# Patient Record
Sex: FEMALE | URBAN - METROPOLITAN AREA
[De-identification: names, ages, dates, MRNs, and addresses within clinical notes are randomized per-mention and may not be internally consistent; named-entity substitution may affect disease eponyms.]

---

## 2023-01-01 ENCOUNTER — HOSPITAL ENCOUNTER (INPATIENT)
Facility: HOSPITAL | Age: 33
LOS: 1 days | DRG: 208 | End: 2023-04-21
Attending: EMERGENCY MEDICINE | Admitting: STUDENT IN AN ORGANIZED HEALTH CARE EDUCATION/TRAINING PROGRAM

## 2023-01-01 VITALS
OXYGEN SATURATION: 68 % | SYSTOLIC BLOOD PRESSURE: 111 MMHG | BODY MASS INDEX: 29.21 KG/M2 | WEIGHT: 158.75 LBS | RESPIRATION RATE: 6 BRPM | HEIGHT: 62 IN | DIASTOLIC BLOOD PRESSURE: 76 MMHG | TEMPERATURE: 96 F

## 2023-01-01 DIAGNOSIS — I46.9 CARDIAC ARREST: ICD-10-CM

## 2023-01-01 LAB
ALBUMIN SERPL BCP-MCNC: 3.6 G/DL (ref 3.5–5.2)
ALLENS TEST: ABNORMAL
ALP SERPL-CCNC: 127 U/L (ref 55–135)
ALT SERPL W/O P-5'-P-CCNC: 192 U/L (ref 10–44)
AMPHET+METHAMPHET UR QL: NEGATIVE
ANION GAP SERPL CALC-SCNC: 25 MMOL/L (ref 8–16)
APTT PPP: 90 SEC (ref 21–32)
APTT PPP: 96.4 SEC (ref 21–32)
AST SERPL-CCNC: 284 U/L (ref 10–40)
B-HCG UR QL: NEGATIVE
BACTERIA #/AREA URNS HPF: ABNORMAL /HPF
BARBITURATES UR QL SCN>200 NG/ML: NEGATIVE
BASOPHILS NFR BLD: 1 % (ref 0–1.9)
BENZODIAZ UR QL SCN>200 NG/ML: NEGATIVE
BILIRUB SERPL-MCNC: 0.2 MG/DL (ref 0.1–1)
BILIRUB UR QL STRIP: NEGATIVE
BNP SERPL-MCNC: 16 PG/ML (ref 0–99)
BUN SERPL-MCNC: 17 MG/DL (ref 6–20)
BZE UR QL SCN: NEGATIVE
CALCIUM SERPL-MCNC: 8.5 MG/DL (ref 8.7–10.5)
CANNABINOIDS UR QL SCN: NEGATIVE
CHLORIDE SERPL-SCNC: 110 MMOL/L (ref 95–110)
CLARITY UR: ABNORMAL
CO2 SERPL-SCNC: 8 MMOL/L (ref 23–29)
COLOR UR: YELLOW
CREAT SERPL-MCNC: 1.9 MG/DL (ref 0.5–1.4)
CREAT UR-MCNC: <5 MG/DL (ref 15–325)
CTP QC/QA: YES
DELSYS: ABNORMAL
DIFFERENTIAL METHOD: ABNORMAL
EOSINOPHIL NFR BLD: 0 % (ref 0–8)
ERYTHROCYTE [DISTWIDTH] IN BLOOD BY AUTOMATED COUNT: 14.1 % (ref 11.5–14.5)
ERYTHROCYTE [SEDIMENTATION RATE] IN BLOOD BY WESTERGREN METHOD: 22 MM/H
ERYTHROCYTE [SEDIMENTATION RATE] IN BLOOD BY WESTERGREN METHOD: 30 MM/H
ERYTHROCYTE [SEDIMENTATION RATE] IN BLOOD BY WESTERGREN METHOD: 35 MM/H
ERYTHROCYTE [SEDIMENTATION RATE] IN BLOOD BY WESTERGREN METHOD: 40 MM/H
EST. GFR  (NO RACE VARIABLE): 34 ML/MIN/1.73 M^2
ETHANOL SERPL-MCNC: <10 MG/DL
FIO2: 100
FIO2: 60
FIO2: 70
FIO2: 80
GLUCOSE SERPL-MCNC: 197 MG/DL (ref 70–110)
GLUCOSE UR QL STRIP: ABNORMAL
HCO3 UR-SCNC: 12.4 MMOL/L (ref 24–28)
HCO3 UR-SCNC: 13.1 MMOL/L (ref 24–28)
HCO3 UR-SCNC: 13.2 MMOL/L (ref 24–28)
HCO3 UR-SCNC: 13.7 MMOL/L (ref 24–28)
HCT VFR BLD AUTO: 41.7 % (ref 37–48.5)
HCT VFR BLD CALC: 41 %PCV (ref 36–54)
HGB BLD-MCNC: 12.4 G/DL (ref 12–16)
HGB BLD-MCNC: 14 G/DL
HGB UR QL STRIP: ABNORMAL
HYALINE CASTS #/AREA URNS LPF: 0 /LPF
IMM GRANULOCYTES # BLD AUTO: ABNORMAL K/UL (ref 0–0.04)
IMM GRANULOCYTES NFR BLD AUTO: ABNORMAL % (ref 0–0.5)
INR PPP: 1.8 (ref 0.8–1.2)
INR PPP: 2.1 (ref 0.8–1.2)
INR PPP: 2.1 (ref 0.8–1.2)
KETONES UR QL STRIP: NEGATIVE
LACTATE SERPL-SCNC: 11.2 MMOL/L (ref 0.5–2.2)
LEUKOCYTE ESTERASE UR QL STRIP: NEGATIVE
LYMPHOCYTES NFR BLD: 71 % (ref 18–48)
MCH RBC QN AUTO: 30.2 PG (ref 27–31)
MCHC RBC AUTO-ENTMCNC: 29.7 G/DL (ref 32–36)
MCV RBC AUTO: 102 FL (ref 82–98)
METHADONE UR QL SCN>300 NG/ML: NEGATIVE
MICROSCOPIC COMMENT: ABNORMAL
MIN VOL: 10.5
MIN VOL: 14
MIN VOL: 17
MODE: ABNORMAL
MONOCYTES NFR BLD: 2 % (ref 4–15)
MYELOCYTES NFR BLD MANUAL: 1 %
NEUTROPHILS NFR BLD: 22 % (ref 38–73)
NEUTS BAND NFR BLD MANUAL: 3 %
NITRITE UR QL STRIP: NEGATIVE
NRBC BLD-RTO: 0 /100 WBC
OPIATES UR QL SCN: NEGATIVE
PCO2 BLDA: 34.5 MMHG (ref 35–45)
PCO2 BLDA: 43.6 MMHG (ref 35–45)
PCO2 BLDA: 51.4 MMHG (ref 35–45)
PCO2 BLDA: 86.8 MMHG (ref 35–45)
PCP UR QL SCN>25 NG/ML: NEGATIVE
PEEP: 5
PH SMN: 6.76 [PH] (ref 7.35–7.45)
PH SMN: 7.02 [PH] (ref 7.35–7.45)
PH SMN: 7.09 [PH] (ref 7.35–7.45)
PH SMN: 7.21 [PH] (ref 7.35–7.45)
PH UR STRIP: 8 [PH] (ref 5–8)
PIP: 23
PIP: 24
PIP: 27
PLATELET # BLD AUTO: 99 K/UL (ref 150–450)
PLATELET BLD QL SMEAR: ABNORMAL
PMV BLD AUTO: 11.3 FL (ref 9.2–12.9)
PO2 BLDA: 344 MMHG (ref 80–100)
PO2 BLDA: 64 MMHG (ref 80–100)
PO2 BLDA: 78 MMHG (ref 80–100)
PO2 BLDA: 85 MMHG (ref 80–100)
POC BE: -14 MMOL/L
POC BE: -17 MMOL/L
POC BE: -18 MMOL/L
POC BE: -23 MMOL/L
POC SATURATED O2: 100 % (ref 95–100)
POC SATURATED O2: 87 % (ref 95–100)
POC SATURATED O2: 89 % (ref 95–100)
POC SATURATED O2: 90 % (ref 95–100)
POC TCO2: 14 MMOL/L (ref 23–27)
POC TCO2: 15 MMOL/L (ref 23–27)
POTASSIUM BLD-SCNC: 4.6 MMOL/L (ref 3.5–5.1)
POTASSIUM SERPL-SCNC: 4.9 MMOL/L (ref 3.5–5.1)
PROT SERPL-MCNC: 7.4 G/DL (ref 6–8.4)
PROT UR QL STRIP: ABNORMAL
PROTHROMBIN TIME: 18.2 SEC (ref 9–12.5)
PROTHROMBIN TIME: 21.3 SEC (ref 9–12.5)
PROTHROMBIN TIME: 21.3 SEC (ref 9–12.5)
RBC # BLD AUTO: 4.11 M/UL (ref 4–5.4)
RBC #/AREA URNS HPF: 19 /HPF (ref 0–4)
SAMPLE: ABNORMAL
SITE: ABNORMAL
SODIUM BLD-SCNC: 141 MMOL/L (ref 136–145)
SODIUM SERPL-SCNC: 143 MMOL/L (ref 136–145)
SP GR UR STRIP: 1.01 (ref 1–1.03)
SP02: 88
SP02: 90
SP02: 90
SQUAMOUS #/AREA URNS HPF: 2 /HPF
TOXICOLOGY INFORMATION: ABNORMAL
TROPONIN I SERPL DL<=0.01 NG/ML-MCNC: 20.37 NG/ML (ref 0–0.03)
URN SPEC COLLECT METH UR: ABNORMAL
UROBILINOGEN UR STRIP-ACNC: NEGATIVE EU/DL
VT: 350
VT: 350
VT: 400
VT: 425
WBC # BLD AUTO: 13.05 K/UL (ref 3.9–12.7)
WBC #/AREA URNS HPF: 12 /HPF (ref 0–5)
YEAST URNS QL MICRO: ABNORMAL

## 2023-01-01 PROCEDURE — 25000003 PHARM REV CODE 250: Performed by: INTERNAL MEDICINE

## 2023-01-01 PROCEDURE — U0002 COVID-19 LAB TEST NON-CDC: HCPCS | Performed by: STUDENT IN AN ORGANIZED HEALTH CARE EDUCATION/TRAINING PROGRAM

## 2023-01-01 PROCEDURE — 94761 N-INVAS EAR/PLS OXIMETRY MLT: CPT

## 2023-01-01 PROCEDURE — 83605 ASSAY OF LACTIC ACID: CPT | Performed by: EMERGENCY MEDICINE

## 2023-01-01 PROCEDURE — 85730 THROMBOPLASTIN TIME PARTIAL: CPT | Mod: 91 | Performed by: EMERGENCY MEDICINE

## 2023-01-01 PROCEDURE — 96365 THER/PROPH/DIAG IV INF INIT: CPT

## 2023-01-01 PROCEDURE — 93005 ELECTROCARDIOGRAM TRACING: CPT

## 2023-01-01 PROCEDURE — 51702 INSERT TEMP BLADDER CATH: CPT

## 2023-01-01 PROCEDURE — 31500 INSERT EMERGENCY AIRWAY: CPT

## 2023-01-01 PROCEDURE — 20000000 HC ICU ROOM

## 2023-01-01 PROCEDURE — 93010 EKG 12-LEAD: ICD-10-PCS | Mod: 76,,, | Performed by: INTERNAL MEDICINE

## 2023-01-01 PROCEDURE — 99291 CRITICAL CARE FIRST HOUR: CPT

## 2023-01-01 PROCEDURE — 85027 COMPLETE CBC AUTOMATED: CPT | Performed by: EMERGENCY MEDICINE

## 2023-01-01 PROCEDURE — 25000003 PHARM REV CODE 250: Performed by: STUDENT IN AN ORGANIZED HEALTH CARE EDUCATION/TRAINING PROGRAM

## 2023-01-01 PROCEDURE — 93010 EKG 12-LEAD: ICD-10-PCS | Mod: ,,, | Performed by: INTERNAL MEDICINE

## 2023-01-01 PROCEDURE — 82077 ASSAY SPEC XCP UR&BREATH IA: CPT | Performed by: EMERGENCY MEDICINE

## 2023-01-01 PROCEDURE — 99900035 HC TECH TIME PER 15 MIN (STAT)

## 2023-01-01 PROCEDURE — 83880 ASSAY OF NATRIURETIC PEPTIDE: CPT | Performed by: EMERGENCY MEDICINE

## 2023-01-01 PROCEDURE — 36600 WITHDRAWAL OF ARTERIAL BLOOD: CPT

## 2023-01-01 PROCEDURE — 81025 URINE PREGNANCY TEST: CPT | Performed by: EMERGENCY MEDICINE

## 2023-01-01 PROCEDURE — 92950 HEART/LUNG RESUSCITATION CPR: CPT

## 2023-01-01 PROCEDURE — 63600175 PHARM REV CODE 636 W HCPCS: Performed by: STUDENT IN AN ORGANIZED HEALTH CARE EDUCATION/TRAINING PROGRAM

## 2023-01-01 PROCEDURE — 87086 URINE CULTURE/COLONY COUNT: CPT | Performed by: EMERGENCY MEDICINE

## 2023-01-01 PROCEDURE — 81000 URINALYSIS NONAUTO W/SCOPE: CPT | Mod: 59 | Performed by: EMERGENCY MEDICINE

## 2023-01-01 PROCEDURE — 85610 PROTHROMBIN TIME: CPT | Mod: 91 | Performed by: EMERGENCY MEDICINE

## 2023-01-01 PROCEDURE — 93010 ELECTROCARDIOGRAM REPORT: CPT | Mod: ,,, | Performed by: INTERNAL MEDICINE

## 2023-01-01 PROCEDURE — 25000003 PHARM REV CODE 250: Performed by: EMERGENCY MEDICINE

## 2023-01-01 PROCEDURE — 63600175 PHARM REV CODE 636 W HCPCS: Performed by: EMERGENCY MEDICINE

## 2023-01-01 PROCEDURE — 84484 ASSAY OF TROPONIN QUANT: CPT | Performed by: EMERGENCY MEDICINE

## 2023-01-01 PROCEDURE — 27202364 HC PAD, COOLING, ANY SIZE

## 2023-01-01 PROCEDURE — 25500020 PHARM REV CODE 255: Performed by: STUDENT IN AN ORGANIZED HEALTH CARE EDUCATION/TRAINING PROGRAM

## 2023-01-01 PROCEDURE — 27201640 HC PAD, ARTICGEL

## 2023-01-01 PROCEDURE — 80307 DRUG TEST PRSMV CHEM ANLYZR: CPT | Performed by: EMERGENCY MEDICINE

## 2023-01-01 PROCEDURE — 27000221 HC OXYGEN, UP TO 24 HOURS

## 2023-01-01 PROCEDURE — 93010 ELECTROCARDIOGRAM REPORT: CPT | Mod: 76,,, | Performed by: INTERNAL MEDICINE

## 2023-01-01 PROCEDURE — 82803 BLOOD GASES ANY COMBINATION: CPT

## 2023-01-01 PROCEDURE — 80053 COMPREHEN METABOLIC PANEL: CPT | Performed by: EMERGENCY MEDICINE

## 2023-01-01 PROCEDURE — 94002 VENT MGMT INPAT INIT DAY: CPT

## 2023-01-01 PROCEDURE — 85007 BL SMEAR W/DIFF WBC COUNT: CPT | Performed by: EMERGENCY MEDICINE

## 2023-01-01 RX ORDER — EPINEPHRINE 0.1 MG/ML
INJECTION INTRAVENOUS CODE/TRAUMA/SEDATION MEDICATION
Status: DISCONTINUED | OUTPATIENT
Start: 2023-01-01 | End: 2023-01-01 | Stop reason: HOSPADM

## 2023-01-01 RX ORDER — SODIUM CHLORIDE 0.9 % (FLUSH) 0.9 %
10 SYRINGE (ML) INJECTION
Status: DISCONTINUED | OUTPATIENT
Start: 2023-01-01 | End: 2023-01-01 | Stop reason: HOSPADM

## 2023-01-01 RX ORDER — FAMOTIDINE 20 MG/1
20 TABLET, FILM COATED ORAL DAILY
Status: DISCONTINUED | OUTPATIENT
Start: 2023-01-01 | End: 2023-01-01 | Stop reason: HOSPADM

## 2023-01-01 RX ORDER — NOREPINEPHRINE BITARTRATE/D5W 4MG/250ML
PLASTIC BAG, INJECTION (ML) INTRAVENOUS
Status: DISCONTINUED
Start: 2023-01-01 | End: 2023-01-01 | Stop reason: HOSPADM

## 2023-01-01 RX ORDER — SODIUM CHLORIDE 9 MG/ML
1000 INJECTION, SOLUTION INTRAVENOUS
Status: DISCONTINUED | OUTPATIENT
Start: 2023-01-01 | End: 2023-01-01 | Stop reason: HOSPADM

## 2023-01-01 RX ORDER — NOREPINEPHRINE BITARTRATE/D5W 4MG/250ML
0-3 PLASTIC BAG, INJECTION (ML) INTRAVENOUS CONTINUOUS
Status: DISCONTINUED | OUTPATIENT
Start: 2023-01-01 | End: 2023-01-01

## 2023-01-01 RX ORDER — HEPARIN SODIUM,PORCINE/D5W 25000/250
0-40 INTRAVENOUS SOLUTION INTRAVENOUS CONTINUOUS
Status: DISCONTINUED | OUTPATIENT
Start: 2023-01-01 | End: 2023-01-01 | Stop reason: HOSPADM

## 2023-01-01 RX ORDER — METOPROLOL TARTRATE 1 MG/ML
INJECTION, SOLUTION INTRAVENOUS
Status: COMPLETED
Start: 2023-01-01 | End: 2023-01-01

## 2023-01-01 RX ORDER — SODIUM CHLORIDE, SODIUM LACTATE, POTASSIUM CHLORIDE, CALCIUM CHLORIDE 600; 310; 30; 20 MG/100ML; MG/100ML; MG/100ML; MG/100ML
INJECTION, SOLUTION INTRAVENOUS CONTINUOUS
Status: DISCONTINUED | OUTPATIENT
Start: 2023-01-01 | End: 2023-01-01

## 2023-01-01 RX ORDER — MUPIROCIN 20 MG/G
OINTMENT TOPICAL 2 TIMES DAILY
Status: DISCONTINUED | OUTPATIENT
Start: 2023-01-01 | End: 2023-01-01 | Stop reason: HOSPADM

## 2023-01-01 RX ADMIN — SODIUM BICARBONATE: 84 INJECTION, SOLUTION INTRAVENOUS at 09:04

## 2023-01-01 RX ADMIN — SODIUM CHLORIDE, POTASSIUM CHLORIDE, SODIUM LACTATE AND CALCIUM CHLORIDE: 600; 310; 30; 20 INJECTION, SOLUTION INTRAVENOUS at 07:04

## 2023-01-01 RX ADMIN — NOREPINEPHRINE BITARTRATE 0.02 MCG/KG/MIN: 1 INJECTION, SOLUTION, CONCENTRATE INTRAVENOUS at 09:04

## 2023-01-01 RX ADMIN — EPINEPHRINE 1 MG: 0.1 INJECTION INTRACARDIAC; INTRAVENOUS at 06:04

## 2023-01-01 RX ADMIN — FAMOTIDINE 20 MG: 20 TABLET, FILM COATED ORAL at 02:04

## 2023-01-01 RX ADMIN — VANCOMYCIN HYDROCHLORIDE 1500 MG: 1.5 INJECTION, POWDER, LYOPHILIZED, FOR SOLUTION INTRAVENOUS at 10:04

## 2023-01-01 RX ADMIN — MUPIROCIN: 20 OINTMENT TOPICAL at 10:04

## 2023-01-01 RX ADMIN — SODIUM CHLORIDE 250 ML: 9 INJECTION, SOLUTION INTRAVENOUS at 10:04

## 2023-01-01 RX ADMIN — PIPERACILLIN AND TAZOBACTAM 4.5 G: 4; .5 INJECTION, POWDER, LYOPHILIZED, FOR SOLUTION INTRAVENOUS; PARENTERAL at 12:04

## 2023-01-01 RX ADMIN — NOREPINEPHRINE BITARTRATE 3 MCG/KG/MIN: 1 INJECTION, SOLUTION, CONCENTRATE INTRAVENOUS at 07:04

## 2023-01-01 RX ADMIN — VASOPRESSIN 0.04 UNITS/MIN: 20 INJECTION INTRAVENOUS at 12:04

## 2023-01-01 RX ADMIN — IOHEXOL 100 ML: 350 INJECTION, SOLUTION INTRAVENOUS at 08:04

## 2023-04-21 PROBLEM — J96.01 ACUTE HYPOXEMIC RESPIRATORY FAILURE: Status: ACTIVE | Noted: 2023-01-01

## 2023-04-21 PROBLEM — D65 DIC (DISSEMINATED INTRAVASCULAR COAGULATION): Status: ACTIVE | Noted: 2023-01-01

## 2023-04-21 PROBLEM — E87.20 LACTIC ACIDOSIS: Status: ACTIVE | Noted: 2023-01-01

## 2023-04-21 PROBLEM — G93.1 HYPOXIC BRAIN INJURY: Status: ACTIVE | Noted: 2023-01-01

## 2023-04-21 PROBLEM — I46.9 CARDIAC ARREST: Status: ACTIVE | Noted: 2023-01-01

## 2023-04-21 NOTE — PROGRESS NOTES
Dr Cross, Pulm CC MD,  BJ and RN at bedside with  to notify sister next of kin of patient's passing and offering autopsy to determine cause of death. Per patient's next of kin, patient's friend/  Chapito will be making all the decisions, to defer all questions to him. According to family, he is enroute to the hospitals. Patient will be transported to [facility] with belonging until friend's arrival.     Charge RN notified.

## 2023-04-21 NOTE — PLAN OF CARE
Brief update note     39 y/o F with no known PMH found unresponsive on a plane s/p more than 60 minutes CPR with ROSC achieved.  Currently on a ventilator, hypothermic with severe acidosis.  Pupils bilaterally dilated and fixed, no gag or cough or corneal reflex present.  Lab suggestive of DIC.  Suspected etiology for cardiac arrest PE.  CT head showed diffuse cerebral edema with cerebellar tonsillar herniation.  Worsening tachycardia, hypoxia.    Patient was flying from [place] to the [place] for work.  Updated patient's work friend Chapito with the help of a Vietnamese  on the telephone who is attempting to come to the night it states but will land tomorrow night.  Patient's only family member is reported to be her sister who lives in [place].  Access to sister difficult as she uses only wifi based services for communication.    Agree with Dr. Arizmendi- further CPR will not change outcome. There is no chance of meaningful recovery regardless of any intervention. She meets criteria for DNR. Will change code status to DNR.       Angela Cross MD   Internal Medicine Hospitalist

## 2023-04-21 NOTE — PROGRESS NOTES
Patient HR from 150s to 90s noted on monitor, sats 70%. Patient is DNR. PEA and then asystole noted on monitor. Unable to get BP despite increase in vasopressor. Dr Nicholson, Dr Cross notified.     1416 Dr Cross at bedside to pronounce patient. TOD 1424

## 2023-04-21 NOTE — PROGRESS NOTES
Pharmacokinetic Initial Assessment: IV Vancomycin    Assessment/Plan:    Initiate intravenous vancomycin with loading dose of 1500 mg once followed by a maintenance dose of vancomycin 750mg IV every 24 hours  Desired empiric serum trough concentration is 15 to 20 mcg/mL  Draw vancomycin trough level 60 min prior to third dose on 1000 at approximately 4/23  Pharmacy will continue to follow and monitor vancomycin.      Please contact pharmacy at extension [phone] with any questions regarding this assessment.     Thank you for the consult,   Wlil Lal       Patient brief summary:  Heather Staley is a 38 y.o. female initiated on antimicrobial therapy with IV Vancomycin for treatment of suspected lower respiratory infection    Drug Allergies:   Review of patient's allergies indicates:  Not on File    Actual Body Weight:   63.5kg    Renal Function:   Estimated Creatinine Clearance: 37.7 mL/min (A) (based on SCr of 1.9 mg/dL (H)).,     Dialysis Method (if applicable):      CBC (last 72 hours):  Recent Labs   Lab Result Units 04/21/23  0707   WBC K/uL 13.05*   Hemoglobin g/dL 12.4   Hematocrit % 41.7   Platelets K/uL 99*   Gran % % 22.0*   Lymph % % 71.0*   Mono % % 2.0*   Eosinophil % % 0.0   Basophil % % 1.0   Differential Method  Manual       Metabolic Panel (last 72 hours):  Recent Labs   Lab Result Units 04/21/23  0810   Sodium mmol/L 143   Potassium mmol/L 4.9   Chloride mmol/L 110   CO2 mmol/L 8*   Glucose mg/dL 197*   BUN mg/dL 17   Creatinine mg/dL 1.9*   Albumin g/dL 3.6   Total Bilirubin mg/dL 0.2   Alkaline Phosphatase U/L 127   AST U/L 284*   ALT U/L 192*       Drug levels (last 3 results):  No results for input(s): VANCOMYCINRA, VANCORANDOM, VANCOMYCINPE, VANCOPEAK, VANCOMYCINTR, VANCOTROUGH in the last 72 hours.    Microbiologic Results:  Microbiology Results (last 7 days)       Procedure Component Value Units Date/Time    Blood Culture #1 **CANNOT BE ORDERED STAT** [[id number]]     Order Status: Canceled  Specimen: Blood     Blood Culture #2 **CANNOT BE ORDERED STAT** [[id number]]     Order Status: Canceled Specimen: Blood

## 2023-04-21 NOTE — PROGRESS NOTES
Informed of patient passing away. Patient seen and examined. After 1 minute auscultation, no spontaneous heartbeat or respiration noted. No withdrawal to noxious stimulation. Pupils dilated and fixed bilaterally. Patient pronounced  at 2:20 PM.    Called patient's sister Igor through a wifi enabled bernardo with the help of electronic Brazilian  to inform of patient's passing with Brie BJ, Dr. Rubi & primary RN Tammie present.  Sister's  picked up the phone and stated Igor is not in a state to talk. Informed patient's brother in law of her passing. Condolences provided. Inquired about autopsy. He stated patient's friend Chapito is [place] and will make all the necessary decisions.     Patient has been released from the . Will remain in morgue until family arrives.     Angela Cross MD   Internal Medicine Hospitalist

## 2023-04-21 NOTE — Clinical Note
Chapito accompanied their family member to the emergency department on 2023. They may return to work on 2023.   needs to attend to a family member who has  at our facility. His family member suffered a cardiac arrest in flight and was transferred to our facility. Please let this letter serve as proof of need for passage to be at the bedside of his loved one.     If you have any questions or concerns, please don't hesitate to call.       ROBERTA

## 2023-04-21 NOTE — PROGRESS NOTES
Patient is a 's case. Will be released to [facility] 's office. Patient's belongings will stay here at [facility] in security department. Charge RN aware. Dr Cross also made aware and asked to relay to sister when informing her of patient's death.     1530  called back to release body. Body will to go to [facility]

## 2023-04-21 NOTE — ASSESSMENT & PLAN NOTE
Patient with Hypoxic Respiratory failure which is Acute.  she is not on home oxygen. Supplemental oxygen was provided and noted- Vent Mode: A/CMV-VC  Oxygen Concentration (%):  [] 100  Resp Rate Total:  [23 br/min-41 br/min] 41 br/min  Vt Set:  [350 mL-425 mL] 425 mL  PEEP/CPAP:  [5 cmH20-8 cmH20] 8 cmH20  Mean Airway Pressure:  [9.7 gvA94-64.4 cmH20] 16.4 cmH20    S/p cardiac arrest

## 2023-04-21 NOTE — HOSPITAL COURSE
33-year-old female with no known past medical history presented after a cardiac arrest on a flight suspected secondary to PE.  Complicated with a prolonged down time of more than 70 and severe anoxic brain injury seen on CT head.  Patient's family lives in [place].  We had trouble getting in touch with patient's sister as she used a wifi accessible line only during initial admission.  Given no chance of meaningful recovery and no benefit of further CPR as per discussion with intensivist Dr. Nicholson, patient was made a DNR.  Patient became hemodynamically unstable with hypoxia, tachycardia and subsequently had a PEA arrest.  Time of death: 1420 on 4/21/23.  Patient's family was informed of passing.  Patient's friend is on the way to the [place] from [place]. Please see separate death note for details.

## 2023-04-21 NOTE — DISCHARGE SUMMARY
[facility] Medicine  Discharge Summary      Patient Name: Heather Staley  MRN: [id number]  FABY: [id number]  Patient Class: IP- Inpatient  Admission Date: 4/21/2023  Hospital Length of Stay: 0 days  Discharge Date and Time:  4/21/23; 2:20PM    Attending Physician: Angela Cross MD   Discharging Provider: Angela Cross MD  Primary Care Provider: No primary care provider on file.    Primary Care Team: Networked reference to record PCT     HPI:   33-year-old female with no known past medical history brought to the ER s/p cardiac arrest.  She was flying from [place] to [place] when she c/o chest pain and became unresponsive.  CPR was started in the flight until she was brought to the ER with a down time of more than 75 minutes, more than 6 rounds of epi.  She was intubated in the ER and started on Levophed.  PH of 6.7, troponin 20.  Urine pregnancy test negative.  ER provider discussed case with on-call cardiologist since this could be a case of PE but she was deemed a poor thrombolysis candidate.  Was started on a heparin drip for presumed PE.  CT head showed severe anoxic brain injury.  Admitted to the ICU.      * No surgery found *      Hospital Course:   33-year-old female with no known past medical history presented after a cardiac arrest on a flight suspected secondary to PE.  Complicated with a prolonged down time of more than 70 and severe anoxic brain injury seen on CT head.  Patient's family lives in [place].  We had trouble getting in touch with patient's sister as she used a wifi accessible line only during initial admission.  Given no chance of meaningful recovery and no benefit of further CPR as per discussion with intensivist Dr. Nicholson, patient was made a DNR.  Patient became hemodynamically unstable with hypoxia, tachycardia and subsequently had a PEA arrest.  Time of death: 1420 on 4/21/23.  Patient's family was informed of passing.  Patient's friend [place] the way to the [place]  from [place]. Please see separate death note for details.       Goals of Care Treatment Preferences:  Code Status: DNR      Consults:   Consults (From admission, onward)          Status Ordering Provider     Pharmacy to dose Vancomycin consult  Once        Provider:  (Not yet assigned)   See [facility] for full Linked Orders Report.    Acknowledged JEFFREY MYERS            Final Active Diagnoses:    Diagnosis Date Noted POA    PRINCIPAL PROBLEM:  Cardiac arrest [I46.9] 2023 Yes    Hypoxic brain injury [G93.1] 2023 Yes    Lactic acidosis [E87.20] 2023 Yes    DIC (disseminated intravascular coagulation) [D65] 2023 Yes    Acute hypoxemic respiratory failure [J96.01] 2023 Yes      Problems Resolved During this Admission:       Discharged Condition:     Significant Diagnostic Studies: Labs:   BMP:   Recent Labs   Lab 23  0810   *      K 4.9      CO2 8*   BUN 17   CREATININE 1.9*   CALCIUM 8.5*   , CMP   Recent Labs   Lab 23  0810      K 4.9      CO2 8*   *   BUN 17   CREATININE 1.9*   CALCIUM 8.5*   PROT 7.4   ALBUMIN 3.6   BILITOT 0.2   ALKPHOS 127   *   *   ANIONGAP 25*   , CBC   Recent Labs   Lab 23  0707 23  0727   WBC 13.05*  --    HGB 12.4  --    HCT 41.7 41   PLT 99*  --    , INR   Lab Results   Component Value Date    INR 2.1 (H) 2023    INR 2.1 (H) 2023    INR 1.8 (H) 2023    and Troponin   Recent Labs   Lab 23  0810   TROPONINI 20.373*       Pending Diagnostic Studies:       None           Medications:  None    Indwelling Lines/Drains at time of discharge:   Lines/Drains/Airways       None                   Time spent on the discharge of patient: 40 minutes      Jeffrey Myers MD  Department of Hospital Medicine  [facility] - Intensive Care

## 2023-04-21 NOTE — SUBJECTIVE & OBJECTIVE
No past medical history on file.    No past surgical history on file.    Review of patient's allergies indicates:  Not on File    No current facility-administered medications on file prior to encounter.     No current outpatient medications on file prior to encounter.     Family History    None       Tobacco Use    Smoking status: Not on file    Smokeless tobacco: Not on file   Substance and Sexual Activity    Alcohol use: Not on file    Drug use: Not on file    Sexual activity: Not on file     Review of Systems   Unable to perform ROS: Intubated   Objective:     Vital Signs (Most Recent):  Temp: (!) 95.7 °F (35.4 °C) (04/21/23 1420)  Pulse: (!) 0 (04/21/23 1420)  Resp: (!) 6 (04/21/23 1420)  BP: 111/76 (04/21/23 1400)  SpO2: (!) 68 % (04/21/23 1415)   Vital Signs (24h Range):  Temp:  [88.9 °F (31.6 °C)-96.4 °F (35.8 °C)] 95.7 °F (35.4 °C)  Pulse:  [0-150] 0  Resp:  [0-97] 6  SpO2:  [68 %-100 %] 68 %  BP: ()/() 111/76     Weight: 72 kg (158 lb 11.7 oz)  Body mass index is 29.03 kg/m².    Physical Exam  Vitals and nursing note reviewed.   Eyes:      Comments: Bilateral pupils dilated, fixed  No corneal reflex   Cardiovascular:      Rate and Rhythm: Normal rate and regular rhythm.      Pulses: Normal pulses.      Heart sounds: Normal heart sounds. No murmur heard.  Pulmonary:      Breath sounds: Normal breath sounds. No wheezing.      Comments: Intubated  Cooling pads on  Musculoskeletal:      Right lower leg: No edema.      Left lower leg: No edema.   Skin:     General: Skin is warm and dry.      Findings: No bruising.           Significant Labs: All pertinent labs within the past 24 hours have been reviewed.    Significant Imaging: I have reviewed all pertinent imaging results/findings within the past 24 hours.

## 2023-04-21 NOTE — NURSING
Notified EVIE 's office of patient death. Spoke to Mona, 's office will  body for autopsy.   Anabell Muñoz RN

## 2023-04-21 NOTE — H&P
[facility] Medicine  History & Physical    Patient Name: Heather Staley  MRN: [id number]  Patient Class: IP- Inpatient  Admission Date: 4/21/2023  Attending Physician: Angela Cross MD   Primary Care Provider: No primary care provider on file.         Patient information was obtained from caregiver / friend and ER records.     Subjective:     Principal Problem:Cardiac arrest    Chief Complaint:   Chief Complaint   Patient presents with    Cardiac Arrest     Patient c/o chest pain while in flight. Pt went unresponsive 5 minutes after complaining. CPR performed on patient from that time until arrival. Pt in PEA on arrival. Pt down 75 minutes prior to arrival        HPI: 33-year-old female with no known past medical history brought to the ER s/p cardiac arrest.  She was flying from [place] to [place] when she c/o chest pain and became unresponsive.  CPR was started in the flight until she was brought to the ER with a down time of more than 75 minutes, more than 6 rounds of epi.  She was intubated in the ER and started on Levophed.  PH of 6.7, troponin 20.  Urine pregnancy test negative.  ER provider discussed case with on-call cardiologist since this could be a case of PE but she was deemed a poor thrombolysis candidate.  Was started on a heparin drip for presumed PE.  CT head showed severe anoxic brain injury.  Admitted to the ICU.      No past medical history on file.    No past surgical history on file.    Review of patient's allergies indicates:  Not on File    No current facility-administered medications on file prior to encounter.     No current outpatient medications on file prior to encounter.     Family History    None       Tobacco Use    Smoking status: Not on file    Smokeless tobacco: Not on file   Substance and Sexual Activity    Alcohol use: Not on file    Drug use: Not on file    Sexual activity: Not on file     Review of Systems   Unable to perform ROS: Intubated    Objective:     Vital Signs (Most Recent):  Temp: (!) 95.7 °F (35.4 °C) (04/21/23 1420)  Pulse: (!) 0 (04/21/23 1420)  Resp: (!) 6 (04/21/23 1420)  BP: 111/76 (04/21/23 1400)  SpO2: (!) 68 % (04/21/23 1415)   Vital Signs (24h Range):  Temp:  [88.9 °F (31.6 °C)-96.4 °F (35.8 °C)] 95.7 °F (35.4 °C)  Pulse:  [0-150] 0  Resp:  [0-97] 6  SpO2:  [68 %-100 %] 68 %  BP: ()/() 111/76     Weight: 72 kg (158 lb 11.7 oz)  Body mass index is 29.03 kg/m².    Physical Exam  Vitals and nursing note reviewed.   Eyes:      Comments: Bilateral pupils dilated, fixed  No corneal reflex   Cardiovascular:      Rate and Rhythm: Normal rate and regular rhythm.      Pulses: Normal pulses.      Heart sounds: Normal heart sounds. No murmur heard.  Pulmonary:      Breath sounds: Normal breath sounds. No wheezing.      Comments: Intubated  Cooling pads on  Musculoskeletal:      Right lower leg: No edema.      Left lower leg: No edema.   Skin:     General: Skin is warm and dry.      Findings: No bruising.           Significant Labs: All pertinent labs within the past 24 hours have been reviewed.    Significant Imaging: I have reviewed all pertinent imaging results/findings within the past 24 hours.    Assessment/Plan:     * Cardiac arrest  Cardiac arrest on the flight after few minutes to reported chest pain   Downtime of more than 75 minutes  CT head abnormal:  Showed findings of diffuse cerebral edema with cerebellar tonsillar herniation  On exam patient with dilated fixed pupils, no corneal, no cough or gag reflex    CTA chest showed no large PE, bedside ultrasound by critical care team showed possible large RV  Etiology of cardiac arrest unclear, suspected to be PE  Started on heparin drip for presumptive PE    Placed on broad-spectrum antibiotics for possible pneumonia given infiltrates on imaging though these are likely related to aspiration    Please see separate note for change in code status to DNR      Acute hypoxemic  respiratory failure  Patient with Hypoxic Respiratory failure which is Acute.  she is not on home oxygen. Supplemental oxygen was provided and noted- Vent Mode: A/CMV-VC  Oxygen Concentration (%):  [] 100  Resp Rate Total:  [23 br/min-41 br/min] 41 br/min  Vt Set:  [350 mL-425 mL] 425 mL  PEEP/CPAP:  [5 cmH20-8 cmH20] 8 cmH20  Mean Airway Pressure:  [9.7 cpS93-90.4 cmH20] 16.4 cmH20    S/p cardiac arrest      DIC (disseminated intravascular coagulation)  Suspected DIC with worsening thrombocytopenia, rising INR      Lactic acidosis  S/p cardiac arrest      Hypoxic brain injury  See above        VTE Risk Mitigation (From admission, onward)         Ordered     IP VTE HIGH RISK PATIENT  Once         04/21/23 1105     Reason for No Pharmacological VTE Prophylaxis  Once        Question:  Reasons:  Answer:  Already adequately anticoagulated on oral Anticoagulants    04/21/23 1105     heparin 25,000 units in dextrose 5% (100 units/ml) IV bolus from bag - ADDITIONAL PRN BOLUS - 60 units/kg  As needed (PRN)        Question:  Heparin Infusion Adjustment (DO NOT MODIFY ANSWER)  Answer:  [email or web address]\epic\Images\Pharmacy\HeparinInfusions\heparin HIGH INTENSITY nomogram for [facility] [id number]    04/21/23 0744     heparin 25,000 units in dextrose 5% (100 units/ml) IV bolus from bag - ADDITIONAL PRN BOLUS - 30 units/kg  As needed (PRN)        Question:  Heparin Infusion Adjustment (DO NOT MODIFY ANSWER)  Answer:  [email or web address]\epic\Images\Pharmacy\HeparinInfusions\heparin HIGH INTENSITY nomogram for [facility] [id number]    04/21/23 0744     heparin 25,000 units in dextrose 5% (100 units/ml) IV bolus from bag INITIAL BOLUS  Once        Question:  Heparin Infusion Adjustment (DO NOT MODIFY ANSWER)  Answer:  [email or web address]\epic\Images\Pharmacy\HeparinInfusions\heparin HIGH INTENSITY nomogram for [facility] [id number].pdf    04/21/23 0744     heparin 25,000 units in dextrose 5% 250 mL (100 units/mL) infusion HIGH INTENSITY nomogram - [facility]  Continuous         Question Answer Comment   Heparin Infusion Adjustment (DO NOT MODIFY ANSWER) [email or web address]\epic\Images\Pharmacy\HeparinInfusions\heparin HIGH INTENSITY nomogram for [facility] [id number].pdf    Begin at (in units/kg/hr) 18        04/21/23 0744              Critical care time spent on the evaluation and treatment of severe organ dysfunction, review of pertinent labs and imaging studies, discussions with consulting providers and discussions with patient/family: 50 minutes.         Angela Cross MD  Department of Hospital Medicine  [place] - Intensive Care

## 2023-04-21 NOTE — CODE/ RAPID DOCUMENTATION
Pt was sinus bradycardia HR of 30. Notified ER MD. Reports Max out levophed at 3 mcg/kg/min. I initiated this. CPR began no pulse was felt.

## 2023-04-21 NOTE — ED PROVIDER NOTES
Encounter Date: 4/21/2023       History     Chief Complaint   Patient presents with    Cardiac Arrest     Patient c/o chest pain while in flight. Pt went unresponsive 5 minutes after complaining. CPR performed on patient from that time until arrival. Pt in PEA on arrival. Pt down 75 minutes prior to arrival     Patient is a 38-year-old woman with an unknown past medical history who arrives in cardiac arrest from the airport.  Patient was on a plane traveling to  [place].  She reportedly had chest pain became diaphoretic and became unresponsive and unconscious.  Bystander CPR was initiated immediately.  Patient received approximately 45 minutes of CPR prior to emergency landing.  EMS reports approximately 20 minutes of CPR in addition.  She received 4 mg of epi an amp of bicarb and 2 mg of Narcan prior to arrival.  Her CBG was reported to be within normal limits.  Patient received an additional 2 doses of epinephrine here and there was Bryant.  Patient had remained    The history is provided by the EMS personnel.   Review of patient's allergies indicates:  Not on File  No past medical history on file.  No past surgical history on file.  No family history on file.     Review of Systems   Unable to perform ROS: Patient unresponsive     Physical Exam     Initial Vitals   BP Pulse Resp Temp SpO2   04/21/23 0717 04/21/23 0645 04/21/23 0706 04/21/23 0827 04/21/23 0705   (!) 98/50 85 (!) 22 (!) 90.9 °F (32.7 °C) (!) 90 %      MAP       --                Physical Exam    Constitutional: She appears distressed.   CPR in progress, supraglottic airway/ hiram device on arrival    HENT:   Head: Normocephalic and atraumatic.   Eyes:   Fixed and dilated   Cardiovascular:  Normal rate and regular rhythm.           No murmur heard.  Pulmonary/Chest: She has no wheezes. She has no rales.   Equal    Abdominal: She exhibits distension.   Musculoskeletal:         General: No edema.     Neurological:   GCS 3T       ED Course    Intubation    Date/Time: 4/21/2023 7:02 AM  Location procedure was performed: [facility] EMERGENCY DEPARTMENT  Performed by: Andrew Muñoz Jr., MD  Authorized by: Andrew Muñoz Jr., MD   Consent Done: Emergent Situation  Indications: airway protection  Intubation method: video-assisted  Patient status: unconscious  Preoxygenation: BVM  Laryngoscope size: Mac 4  Tube size: 7.5 mm  Tube type: cuffed  Number of attempts: 2  Ventilation between attempts: BVM  Cricoid pressure: yes  Cords visualized: yes  Post-procedure assessment: chest rise and CO2 detector  Breath sounds: equal and absent over the epigastrium  Cuff inflated: yes  ETT to lip: 22 cm  Chest x-ray findings: endotracheal tube in appropriate position      Labs Reviewed   CBC W/ AUTO DIFFERENTIAL - Abnormal; Notable for the following components:       Result Value    WBC 13.05 (*)      (*)     MCHC 29.7 (*)     Platelets 99 (*)     Gran % 22.0 (*)     Lymph % 71.0 (*)     Mono % 2.0 (*)     Platelet Estimate Decreased (*)     All other components within normal limits   APTT - Abnormal; Notable for the following components:    aPTT 96.4 (*)     All other components within normal limits   PROTIME-INR - Abnormal; Notable for the following components:    Prothrombin Time 18.2 (*)     INR 1.8 (*)     All other components within normal limits   APTT - Abnormal; Notable for the following components:    aPTT 90.0 (*)     All other components within normal limits    Narrative:     (if patient is on warfarin prior to heparin therapy)   PROTIME-INR - Abnormal; Notable for the following components:    Prothrombin Time 21.3 (*)     INR 2.1 (*)     All other components within normal limits    Narrative:     (if patient is on warfarin prior to heparin therapy)   PROTIME-INR - Abnormal; Notable for the following components:    Prothrombin Time 21.3 (*)     INR 2.1 (*)     All other components within normal limits    Narrative:     (if patient is on warfarin prior  to heparin therapy)   TROPONIN I - Abnormal; Notable for the following components:    Troponin I 20.373 (*)     All other components within normal limits    Narrative:     (if patient is on warfarin prior to heparin therapy)   ISTAT PROCEDURE - Abnormal; Notable for the following components:    POC PH 6.763 (*)     POC PCO2 86.8 (*)     POC PO2 344 (*)     POC HCO3 12.4 (*)     POC TCO2 15 (*)     All other components within normal limits   ALCOHOL,MEDICAL (ETHANOL)   B-TYPE NATRIURETIC PEPTIDE    Narrative:     (if patient is on warfarin prior to heparin therapy)   POCT URINE PREGNANCY          Imaging Results              CTA Chest Non-Coronary (PE Studies) (Final result)  Result time 04/21/23 09:08:23      Final result by Stephen Armstrong III, MD (04/21/23 09:08:23)                   Impression:      No evidence of pulmonary embolus or acute aortic syndrome.    Bilateral posterior pulmonary infiltrates worrisome for pneumonia or aspiration.      Electronically signed by: Stephen Armstrong MD  Date:    04/21/2023  Time:    09:08               Narrative:    EXAMINATION:  CTA CHEST NON CORONARY (PE STUDIES)    CLINICAL HISTORY:  Pulmonary embolism (PE) suspected, high prob;    FINDINGS:  Patient was administered 100 cc of Omnipaque 350 intravenously.    The arch and great vessels are unremarkable.  Pulmonary vessels are well opacified.  No pulmonary embolus is seen.  No mediastinal, hilar, or axillary adenopathy is seen.  There are posterior pulmonary infiltrates bilaterally.  No nodules or masses are seen.  Bones show nothing unusual.                                       CT Head Without Contrast (Final result)  Result time 04/21/23 09:19:00      Final result by Rod Gomez MD (04/21/23 09:19:00)                   Impression:      Abnormal examination findings of diffuse cerebral edema with cerebellar tonsillar herniation.    Case discussed with Dr. Muñoz on 04/21/2023 at 09:18.      Electronically signed by: Rod  MD Patricia  Date:    04/21/2023  Time:    09:19               Narrative:    EXAMINATION:  CT HEAD WITHOUT CONTRAST    CLINICAL HISTORY:  Mental status change, unknown cause;    TECHNIQUE:  Low dose axial images were obtained through the head.  Coronal and sagittal reformations were also performed. Contrast was not administered.    COMPARISON:  None.    FINDINGS:  The subcutaneous tissues are unremarkable.  The bony calvarium is intact.  The paranasal sinuses are unremarkable.  The mastoid air cells air cells are clear.  The orbits and intraorbital contents are within normal limits.    There is diffuse cerebral edema with loss of the gray-white differentiation.  There is effacement of the CSF spaces with slit like appearance of the lateral ventricles.  There is cerebellar tonsillar herniation.  There is no evidence of intracranial hemorrhage.                                       XR NG/OG tube placement check, non-radiologist performed (Final result)  Result time 04/21/23 08:18:08      Final result by Rod Gomez MD (04/21/23 08:18:08)                   Impression:      1.  Endotracheal tube tip 3 cm from the poncho.    2.  Enteric tube tip within the proximal aspect of stomach with the side port above the GE junction.  Advancement may be obtained, as clinically warranted.    3.  Marked distention of the stomach.      Electronically signed by: Rod Gomez MD  Date:    04/21/2023  Time:    08:18               Narrative:    EXAMINATION:  XR NG/OG TUBE PLACEMENT CHECK, NON-RADIOLOGIST PERFORMED    CLINICAL HISTORY:  og tube;    TECHNIQUE:  Five x-ray views of the chest are obtained at multiple time points from 7: 42 07:56.    COMPARISON:  None    FINDINGS:  There is slight eccentric position of the endotracheal tube 3 cm from the poncho to the right.  The enteric tube terminates within the proximal aspect of the stomach.  The side port is above the GE junction.  There is a pacer pad in place.    The cardiomediastinal  silhouette does not appear significantly enlarged.  There are no pleural effusions.  There is no evidence of a pneumothorax.  There is no evidence of pneumomediastinum.  No airspace opacity is present.    There is marked distention of the stomach.  No gross pneumoperitoneum identified.                                       Medications   iohexoL (OMNIPAQUE 350) injection 100 mL (100 mLs Intravenous Given 4/21/23 0851)   vancomycin 1,500 mg in dextrose 5 % (D5W) 250 mL IVPB (Vial-Mate) (0 mg Intravenous Stopped 4/21/23 1204)   metoprolol (LOPRESSOR) 5 mg/5 mL injection (  Return to Cabinet 4/21/23 1200)     Medical Decision Making:   Differential Diagnosis:   Differential Diagnosis includes, but is not limited to:  Primary cardiac arrest, MI/ACS, arrhythmia, aortic dissection, cardiogenic shock, respiratory failure, airway obstruction, anaphylaxis, PE, sepsis, trauma, head injury, hemorrhagic shock, CVA, hyperkalemia, hypoglycemia, hypothermia, metabolic derangement, drug overdose, drug intoxication.            Attending Attestation:         Attending Critical Care:   Critical Care Times:   ==============================================================  Total Critical Care Time - exclusive of procedural time: 35 minutes.  ==============================================================  Critical care was necessary to treat or prevent imminent or life-threatening deterioration of the following conditions: cardiac arrest.         ED Course as of 04/22/23 0633   Fri Apr 21, 2023   0708 EKG:  Rate 50.  Sinus bradycardia.  Right bundle-branch block.  No STEMI.  No ectopy. [RN]   0730 EKG: Rate 111.  Sinus tachycardia.  Diffuse ST depressions.  Negative STEMI criteria. [RN]   0731 Patient was briefly incardiac arrest.  On Levophed.  We obtained ROSC afterapproximately 4 minutes of chest compressions.  I discussed with Dr. Castañeda reviewed history of present illness.  Does not recommend thrombolysis  given poor prognosis but  would recommend heparin.  She is not a PCI candidate at this time. EKG made available. Will begin cooling, CT head to evaluate for ICH and admit to ICU. Attempting to contact family. Poor prognosis.  [RN]      ED Course User Index  [RN] Andrew Muñoz Jr., MD                 Clinical Impression:   Final diagnoses:  [I46.9] Cardiac arrest        ED Disposition Condition    Admit Stable            Portions of this note were dictated using voice recognition software and may contain dictation related errors in spelling/grammar/syntax not found on text review          Andrew Muñoz Jr., MD  04/22/23 3147

## 2023-04-21 NOTE — PROGRESS NOTES
Spoke with Chapito, friend on phone. Per hCapito, will arrive to [place] 1PM gilberto (). Aware that patient is . He is also inquiring about some form of documentation that shows patient is currently here at hospital for immigration approval reasons. Called transferred to [facility] is here to escort patient to [facility]. Per security, will keep patient's personal belongings in security room vs [facility] due to electronic devices & ect. 2 patient belonging bag, with tag on them sent with security.

## 2023-04-21 NOTE — Clinical Note
Chapito accompanied their family member to the emergency department on 4/21/2023. They may return to work on 04/21/2023.      If you have any questions or concerns, please don't hesitate to call.       RN

## 2023-04-21 NOTE — PROGRESS NOTES
Patient persistently tachycardic now sustaining 140s. Remains in Levo 32mg concentration at 1mcg/kg/min. Patient also hypoxic sustaining sats 87-88% on current fi02 of 100% via ventilator. Thin bright red blood noted to be pooling from her mouth, pad under head soaked in blood. Blood also noted when suctioning mouth. Dr Sanders and Dr Lyn manning CC at bedside to access patient. Currently still warming via TTM protocol and machine. No new orders. Plan to continue to warm, possible brain study today.

## 2023-04-21 NOTE — ASSESSMENT & PLAN NOTE
Cardiac arrest on the flight after few minutes to reported chest pain   Downtime of more than 75 minutes  CT head abnormal:  Showed findings of diffuse cerebral edema with cerebellar tonsillar herniation  On exam patient with dilated fixed pupils, no corneal, no cough or gag reflex    CTA chest showed no large PE, bedside ultrasound by critical care team showed possible large RV  Etiology of cardiac arrest unclear, suspected to be PE  Started on heparin drip for presumptive PE    Placed on broad-spectrum antibiotics for possible pneumonia given infiltrates on imaging though these are likely related to aspiration    Please see separate note for change in code status to DNR

## 2023-04-21 NOTE — CONSULTS
Consult Note  Pulmonary & Critical Care Medicine    Attending: Hernandez Nicholson  Fellow: Negin Sanders  Admit Date: 4/21/2023  Today's Date: 04/21/2023  Reason for Consult:  Cardiac Arrest    SUBJECTIVE:     HPI: Ms. Staley is a 37yo  female with no known PMH who presented as a cardiac arrest. Per report, she became diaphoretic and complained for chest pain while on a flight. She then became unresponsive and unconscious. At that time, bystander CPR was initiated.  She received ~45 minutes of CPR prior to emergency landing at [facility].  EMS reports approximately 20 minutes of CPR en route as well.  She received 4 mg of epi an amp of bicarb and 2 mg of Narcan prior to arrival.  Her CBG was reported to be within normal limits.  In ED, she became bradycardic and lost her pulse again. ROSC was achieved. She was intubated in the ED and started on levophed. Data ws gathered through CT images and labs. Transferred to the ED for continued care. CT head consistent with diffuse cerebral edema and herniation. CT chest concerning for pulmonary embolism.       Review of patient's allergies indicates:  Not on File    No past medical history on file.  No past surgical history on file.  No family history on file.       All medications reviewed.    Review of Systems   Unable to perform ROS: Intubated     OBJECTIVE:     Vital Signs Trends/Hx Reviewed  Vitals:    04/21/23 1230 04/21/23 1245 04/21/23 1300 04/21/23 1315   BP: (!) 130/90 (!) 123/96 (!) 141/101 126/64   BP Location:       Patient Position:       Pulse: (!) 150 (!) 149 (!) 149 (!) 147   Resp: (!) 36 (!) 40 (!) 40 (!) 0   Temp: 96.1 °F (35.6 °C) 96.4 °F (35.8 °C) 96.4 °F (35.8 °C)    TempSrc:   Core Esophageal    SpO2: (!) 91% (!) 94% (!) 93% (!) 93%   Weight:       Height:         Physical Exam:  General: Intubated  HEENT: AT/NC, nonreactive pupils. No corneal reflex. oral and nasal mucosa moist. Blood noted around mouth and nose. ETT in place.   Neck: Supple without JVD  or palpable LAD.   Cardiac: normal rate, regular rhythm, with no MRG with brisk cap refill and symmetric pulses in distal extremities.  Respiratory: Normal inspection. Symmetric chest rise. Normal palpation and percussion. Auscultation clear bilaterally. No increased work of breathing noted.   Abdomen: Soft, NT/ND. +BS. No hepatosplenomegaly.   Extremities: No edema.   Neuro: No neurologic reflexes or activity noted. No response to pain. No cough or gag reflex.      Laboratory:  Recent Labs   Lab 04/21/23  1126   PH 7.207*   PCO2 34.5*   PO2 64*   HCO3 13.7*   POCSATURATED 87*   BE -14     Recent Labs   Lab 04/21/23  0707 04/21/23  0727   WBC 13.05*  --    RBC 4.11  --    HGB 12.4  --    HCT 41.7 41   PLT 99*  --    *  --    MCH 30.2  --    MCHC 29.7*  --      Recent Labs   Lab 04/21/23  0810      K 4.9      CO2 8*   BUN 17   CREATININE 1.9*       Microbiology Data:   Microbiology Results (last 7 days)       Procedure Component Value Units Date/Time    Urine culture [[id number]] Collected: 04/21/23 0925    Order Status: No result Specimen: Urine Updated: 04/21/23 1016    Blood Culture #1 **CANNOT BE ORDERED STAT** [[id number]]     Order Status: Canceled Specimen: Blood     Blood Culture #2 **CANNOT BE ORDERED STAT** [[id number]]     Order Status: Canceled Specimen: Blood              Chest Imaging:   No new imaging.     Infusions:     heparin (porcine) in D5W      NORepinephrine bitartrate-D5W 1.2 mcg/kg/min (04/21/23 1100)    NORepinephrine (LEVOPHED) infusion 2.5 mcg/kg/min (04/21/23 0836)    sodium bicarbonate drip 100 mL/hr at 04/21/23 1100    vasopressin 0.04 Units/min (04/21/23 1222)       Scheduled Medications:    famotidine  20 mg Oral Daily    heparin (PORCINE)  80 Units/kg Intravenous Once    mupirocin   Nasal BID    NORepinephrine bitartrate-D5W        piperacillin-tazobactam (ZOSYN) IVPB  4.5 g Intravenous Q8H       PRN Medications:   sodium chloride 0.9%, EPINEPHrine, heparin (PORCINE),  heparin (PORCINE), NORepinephrine (LEVOPHED) infusion, sodium chloride 0.9%, Pharmacy to dose Vancomycin consult **AND** vancomycin - pharmacy to dose    Assessment & Plan:   Patient Active Problem List   Diagnosis    Hypoxic brain injury    Cardiac arrest       ASSESSMENT & RECOMMENDATIONS     While in ICU, she was noted to be pooling blood from her mouth and nose concerning for DIC. She was requiring increasing doses of levophed requiring the addition of vasopressin. She was autocooled to 31 degrees and artic sun was placed in effort to warm her. Her sister in [place] was noted to be her NOK. Critical Care team called her sister, Igor, and explained what was going on. Igor requested we call her back in 1 hour for her to gather herself to make a decision on comfort care. Prior to team being able to call her back, Ms. Staley went into cardiac arrest again despite our best efforts. Prior to her cardiac arrest, she was made DNR 2/2 there not being a meaningful chance of recovery. Igor was notified and condolences were offered.       Thank you for allowing us to participate in the care of this patient. Please call with questions.    Negin Sanders M.D., PGY-V  [employer] Pulmonary/Critical Care Fellow

## 2023-04-21 NOTE — PLAN OF CARE
SW was notified that pts friend, Chapito (friend) - [phone] is finding flight to [place] to make it in town sometime tomorrow. Per pts friend pts sister does not own passport and they are working to get emergency visa. Pts friend expressed that he is communicating all information to pts sister. Per friend pt does not have any other family besides her sister.     Please note language line was used and pts sister was unable to be reached.     Chapito (friend) - [phone]   Igor (sister) - [phone]     line: [phone]     04/21/23 0956   Post-Acute Status   Post-Acute Authorization Other

## 2023-04-21 NOTE — PLAN OF CARE
37 yo with cardiopulmonary arrest with prolonged CPR. Currently hypothermic and acidemic, precluding brain death exam. Only relative is sister in [place], who does not have a telephone - intermittent access via wifi-based bernardo only.  Head CT: The subcutaneous tissues are unremarkable.  The bony calvarium is intact.  The paranasal sinuses are unremarkable.  The mastoid air cells air cells are clear.  The orbits and intraorbital contents are within normal limits.  There is diffuse cerebral edema with loss of the gray-white differentiation.  There is effacement of the CSF spaces with slit like appearance of the lateral ventricles.  There is cerebellar tonsillar herniation.  There is no evidence of intracranial hemorrhage.  Laboratory consistent with DIC. Active bleeding from OP.  Severe hypoxia and shock.    There is no meaningful chance of recovery at this point and further CPR would not change outcome. Will change status to Do Not Resuscitate.     Hernandez Nicholson MD  [facility] Pul[facility] / Critical Care.

## 2023-04-21 NOTE — ED NOTES
Keith Danielson called and identified himself as a friend of the patients family who live in [place]. I notified Keith that the patient is present in the ICU. Passed his contact information on to ICU RN to help facilitate communication with next of kin.    Keith Danielson  Family Friend  [phone]

## 2023-04-23 LAB
BACTERIA UR CULT: NORMAL
SARS-COV-2 RDRP RESP QL NAA+PROBE: NEGATIVE

## 2023-05-08 DIAGNOSIS — I46.9 CARDIAC ARREST: Primary | ICD-10-CM

## 2023-05-08 DIAGNOSIS — R00.0 SINUS TACHYCARDIA: Primary | ICD-10-CM

## 2023-05-22 DIAGNOSIS — I46.9 CARDIAC ARREST: Primary | ICD-10-CM
